# Patient Record
Sex: MALE | Race: WHITE
[De-identification: names, ages, dates, MRNs, and addresses within clinical notes are randomized per-mention and may not be internally consistent; named-entity substitution may affect disease eponyms.]

---

## 2020-02-11 ENCOUNTER — HOSPITAL ENCOUNTER (EMERGENCY)
Dept: HOSPITAL 41 - JD.ED | Age: 40
Discharge: HOME | End: 2020-02-11
Payer: COMMERCIAL

## 2020-02-11 DIAGNOSIS — N45.1: Primary | ICD-10-CM

## 2020-02-11 DIAGNOSIS — N41.9: ICD-10-CM

## 2020-02-11 DIAGNOSIS — Z98.52: ICD-10-CM

## 2020-02-11 NOTE — EDM.PDOC
ED HPI GENERAL MEDICAL PROBLEM





- General


Chief Complaint: Genitourinary Problem


Stated Complaint: SENT FROM [place]


Time Seen by Provider: 02/11/20 19:45


Source of Information: Reports: Patient


History Limitations: Reports: No Limitations





- History of Present Illness


INITIAL COMMENTS - FREE TEXT/NARRATIVE: 


39-year-old male presents to the ED with diffuse left-sided testicular pain. 

Pain often is very colicky and intense for the last 13days. He is experiencing 

intermittent left testicular pain since having vasectomy in 2017. States pain 

started about 3 months after the fact. He has had one ultrasound which showed a 

small hydrocele but no other prongs. Patient is concerned that he may have 

cancer the testicle. States the pain was bad enough today that he became 

incontinent of a small amount of urine. This has not occurred before. He 

reports no other problems with voiding or dysuria but has had intermittent 

penile pain suggestive of prostatitis. He has never received any antibiotics to 

his knowledge for epididymitis or prostatitis. Denies any fever or chills. 

Denies any blood in the ejaculate. Nice painful ejaculation.





Onset: Gradual


Onset Date: 01/29/20


Duration: Week(s):, Getting Worse, Intermittent, Waxing/Waning


Location: Reports: Other (Left testicle.)


Quality: Reports: Ache, Throbbing, Other


Severity: Moderate (A sharp pain is sharp and stabbing)


Improves with: Reports: None


Worsens with: Reports: None


Context: Denies: Activity, Exercise, Lifting, Sick Contact, Trauma, Other


Associated Symptoms: Reports: No Other Symptoms


Treatments PTA: Reports: Other (see below) (None.)





- Related Data


 Allergies











Allergy/AdvReac Type Severity Reaction Status Date / Time


 


No Known Allergies Allergy   Verified 02/11/20 19:13











Home Meds: 


 Home Meds





Ciprofloxacin HCl [Cipro] 500 mg PO BID #28 tablet 02/11/20 [Rx]


Diclofenac Sodium [Voltaren] 75 mg PO BIDMEALS #16 tab.cr 02/11/20 [Rx]


Doxycycline [Vibramycin] 100 mg PO BID #60 cap 02/11/20 [Rx]











Past Medical History





- Past Surgical History


Male  Surgical History: Reports: Vasectomy (In 2017.)





Social & Family History





- Living Situation & Occupation


Occupation: Employed





ED ROS GENERAL





- Review of Systems


Review Of Systems: See Below


Constitutional: Denies: Fever, Chills, Malaise, Weakness, Fatigue, Decreased 

Appetite, Weight Loss


HEENT: Reports: No Symptoms


Respiratory: Reports: No Symptoms


Cardiovascular: Reports: No Symptoms


Endocrine: Reports: No Symptoms


GI/Abdominal: Reports: Abdominal Pain (Left inguinal pain.)


: Reports: Incontinence (Small amount of urine loss today due to the 

intensity of pain.), Pain (Left testicular pain and swelling. Noted more so on 

the superior aspect of the testicle.).  Denies: Flank Pain, Frequency, Urgency


Musculoskeletal: Reports: No Symptoms


Skin: Reports: No Symptoms


Neurological: Reports: No Symptoms


Psychiatric: Reports: No Symptoms


Hematologic/Lymphatic: Reports: No Symptoms


Immunologic: Reports: No Symptoms





ED EXAM, RENAL/





- Physical Exam


Exam: See Below


Exam Limited By: No Limitations


General Appearance: Alert, WD/WN, No Apparent Distress, Other (Final signs are 

all normal with a blood pressure mildly elevated 140 7/1/02.)


GI/Abdominal: Normal Bowel Sounds, Soft, Non-Tender, No Organomegaly, No 

Abnormal Bruit, No Mass, Pelvis Stable


 (Male) Exam: No Hernia (Hernia on invagination of the scrotum bilaterally.), 

Testicular Tenderness (L) (Superior left testicular tenderness. Localized to 

the superior aspect of the vas deferens above the site of vasectomy.).  No: 

Scrotal Swelling, Scrotum Tenderness (L), Scrotum Tenderness (R), Suprapubic 

Fullness


Extremities: Normal Inspection, Normal Range of Motion, Non-Tender


Neurological: Alert, Oriented, CN II-XII Intact, Normal Cognition


Psychiatric: Normal Affect, Normal Mood (Mildly anxious.), Anxious


Skin Exam: Warm, Dry, Intact, Normal Color, No Rash





Course





- Vital Signs


Last Recorded V/S: 


 Last Vital Signs











Temp  36.5 C   02/11/20 19:14


 


Pulse  84   02/11/20 19:14


 


Resp  16   02/11/20 19:14


 


BP  147/102 H  02/11/20 19:14


 


Pulse Ox  97   02/11/20 19:14














- Orders/Labs/Meds


Orders: 


 Active Orders 24 hr











 Category Date Time Status


 


 Scrotum and Contents [US] Stat Exams  02/11/20 19:45 Taken











Labs: 


 Laboratory Tests











  02/11/20 Range/Units





  21:00 


 


Urine Color  Yellow  (Yellow)  


 


Urine Appearance  Clear  (Clear)  


 


Urine pH  5.5  (5.0-8.0)  


 


Ur Specific Gravity  > or = 1.030  (1.005-1.030)  


 


Urine Protein  Negative  (Negative)  


 


Urine Glucose (UA)  Negative  (Negative)  


 


Urine Ketones  Negative  (Negative)  


 


Urine Occult Blood  Negative  (Negative)  


 


Urine Nitrite  Negative  (Negative)  


 


Urine Bilirubin  Negative  (Negative)  


 


Urine Urobilinogen  0.2  (0.2-1.0)  


 


Ur Leukocyte Esterase  Negative  (Negative)  


 


Urine RBC  Not seen  (0-5)  /hpf


 


Urine WBC  Not seen  (0-5)  /hpf


 


Ur Squamous Epith Cells  Not seen  (0-5)  /hpf


 


Urine Bacteria  Occasional  (FEW)  /hpf


 


Urine Mucus  Few  (FEW)  /hpf














- Radiology Interpretation


Free Text/Narrative:: 


39-year-old male presents to the ED for evaluation of left testicular pain that 

he's had off and on for the last 2 weeks. This had intermittent problems with 

left sided scrotal pain since having vasectomy done in 2017. Since that he's 

ever had. Pain is constant but intermittently colicky are sharp and stabbing. 

Was bad enough that it did cause him to lose a small quantity of urine today. 

He has intermittent penile pain. No dysuria and no blood in the ejaculate. 

Examination reveals tenderness well localized to the superior aspect of the vas 

deferens appeared where he had vasectomy performed. The testicle itself is 

mildly tender but shows no masses or swelling. Some swelling or engorgement of 

the superior aspect of the vas deferens and vasculature surrounding the 

superior vas deferens. Prostatitis. Plan patient is very concerned about 

possibility of testicular cancer. An ultrasound of the testicle will be 

performed. Urinalysis will be performed if one becomes available. Clinically 

the patient is suffered from recurrent prostatitis. Retrograde pain into the 

vas deferens on the left side.








- Re-Assessments/Exams


Free Text/Narrative Re-Assessment/Exam: 





02/11/20 21:32: Ultrasound of the scrotum reveals a few small cysts around the 

right testicle and diffuse small hydroceles. On the left side it does show 

engorgement of the superior aspect of the left vas deferens as is clinically 

evident. The testicle itself reveals no evidence of any cancer. There is a 

small hydrocele adjacent to the epididymis. No other fluid within the scrotum. 

Patient advised that clinically he has a left-sided vas deferens inflammation 

with low-grade epididymitis which is secondary to recurrent prostatitis by 

history. To place him on Voltaren 75 mg twice a day for the next 8 days to 

relieve pain and inflammation which will allow him to work. He is to start 

antibiotic Cipro 500 mg twice daily for the next 14 days and follow this with 

30 days of doxycycline 100 mg twice a day to hopefully eradicate prostate 

infection. Appendiceal urologist next week Monday. Advised to continue to follow

-up with his urologist as this will provide follow-up for him in the future. 

Did give him a copy of his ultrasound of the scrotum to take with him.








Departure





- Departure


Time of Disposition: 21:38


Disposition: Home, Self-Care 01


Condition: Fair


Clinical Impression: 


 Prostatitis, Epididymitis








- Discharge Information


*PRESCRIPTION DRUG MONITORING PROGRAM REVIEWED*: Not Applicable


*COPY OF PRESCRIPTION DRUG MONITORING REPORT IN PATIENT [facility]: Not Applicable


Prescriptions: 


Ciprofloxacin HCl [Cipro] 500 mg PO BID #28 tablet


Diclofenac Sodium [Voltaren] 75 mg PO BIDMEALS #16 tab.cr


Doxycycline [Vibramycin] 100 mg PO BID #60 cap


Instructions:  Epididymitis, Prostatitis


Referrals: 


PCP,None [Primary Care Provider] - 


Forms:  ED Department Discharge


Additional Instructions: 


Evaluation in the emergency him today in regards to acute onset of recurrent 

pain left hemiscrotum. Examination reveals pain and inflammation with slight 

swelling of the superior vas deferens on the left side. There is also mild 

inflammation of the epididymis on the left superior testicle. The testicle 

itself feels to be normal as does the right side. The right side shows no 

evidence of inflammation. There is no signs of inguinal hernia on examination. 

Similar type pain intermittently since having had vasectomy in 2017. Sound was 

carried out on the scrotum and minimal hydroceles which are of no consequence 

were identified and a few cystic structures around the right testicle again 

which are of no consequence. The radiologist did appreciate some congestion and 

swelling of the superior portion of the vas deferens which travels to your 

prostate gland on the left side. Diagnosis is prostate infection with 

retrograde infection of the vas deferens and epididymis on the left side. Follow

-up with urologist as planned. Treatment is to be anti-inflammatory medications 

Voltaren 75 mg twice daily for the next 8 days to provide pain relief. 

Antibiotic is to be Cipro 500 mg twice daily for the next 14 days and then this 

is to be followed by antibiotic Doxycycline  100 mg twice daily for another 30 

days to try and clear up prostate infection which is very difficult to 

eradicate. Expect gradual improvement in left testicular pain over the next 3-

10 days.





Sepsis Event Note





- Evaluation


Sepsis Screening Result: No Definite Risk





- Focused Exam


Vital Signs: 


 Vital Signs











  Temp Pulse Resp BP Pulse Ox


 


 02/11/20 19:14  36.5 C  84  16  147/102 H  97











Date Exam was Performed: 02/11/20


Time Exam was Performed: 22:21





- My Orders


Last 24 Hours: 


My Active Orders





02/11/20 19:45


Scrotum and Contents [US] Stat 














- Assessment/Plan


Last 24 Hours: 


My Active Orders





02/11/20 19:45


Scrotum and Contents [US] Stat

## 2020-02-12 NOTE — US
Testicular ultrasound: Multiple real-time images of the testicles were

 obtained.

 

Testicles have a homogeneous ultrasound appearance.  Arterial and 

venous blood flow are seen within both testicles.  Minimal bilateral 

hydroceles are noted.  Dilated tubular structures are seen within the 

left epididymal tail which appears to be due to dilated ducts.  Two 

small cysts are noted within the inferior right testicle measuring 2.6

 mm and 2.7 mm.

 

Measurements:

Right testicle: 5.3 x 1.8 x 3.2 cm

Left testicle: 4.8 x 2.0 x 3.0 cm

 

Impression:

1.  Tubular structures within the left epididymal tail.  Given history

 of prior vasectomy findings most likely represent post vasectomy 

dilatation.  This is felt to be a normal variant.

2.  Minimal hydroceles.

3.  Two cystic areas within the inferior right testicle.  Follow-up 

testicular ultrasound is recommended in 4 months to confirm stability 

of these findings.  Follow-up study would occur in June, 2020.

 

Diagnostic code #9

 

This report was dictated in [place] Standard Time

 

I agree with preliminary report from [facility], finalized on 02/11/20, 

10:26 PM Central Time